# Patient Record
Sex: FEMALE | Race: OTHER | NOT HISPANIC OR LATINO
[De-identification: names, ages, dates, MRNs, and addresses within clinical notes are randomized per-mention and may not be internally consistent; named-entity substitution may affect disease eponyms.]

---

## 2018-08-31 ENCOUNTER — APPOINTMENT (OUTPATIENT)
Dept: OBGYN | Facility: CLINIC | Age: 38
End: 2018-08-31

## 2018-11-27 ENCOUNTER — APPOINTMENT (OUTPATIENT)
Dept: OBGYN | Facility: CLINIC | Age: 38
End: 2018-11-27
Payer: COMMERCIAL

## 2018-11-27 VITALS
SYSTOLIC BLOOD PRESSURE: 120 MMHG | HEIGHT: 59 IN | DIASTOLIC BLOOD PRESSURE: 75 MMHG | BODY MASS INDEX: 23.07 KG/M2 | WEIGHT: 114.44 LBS | HEART RATE: 63 BPM

## 2018-11-27 DIAGNOSIS — R10.32 LEFT LOWER QUADRANT PAIN: ICD-10-CM

## 2018-11-27 PROCEDURE — 99395 PREV VISIT EST AGE 18-39: CPT

## 2018-11-27 RX ORDER — CHROMIUM 200 MCG
TABLET ORAL
Refills: 0 | Status: ACTIVE | COMMUNITY

## 2019-01-07 ENCOUNTER — APPOINTMENT (OUTPATIENT)
Dept: ULTRASOUND IMAGING | Facility: CLINIC | Age: 39
End: 2019-01-07
Payer: COMMERCIAL

## 2019-01-07 ENCOUNTER — APPOINTMENT (OUTPATIENT)
Dept: MAMMOGRAPHY | Facility: CLINIC | Age: 39
End: 2019-01-07
Payer: COMMERCIAL

## 2019-01-07 ENCOUNTER — OUTPATIENT (OUTPATIENT)
Dept: OUTPATIENT SERVICES | Facility: HOSPITAL | Age: 39
LOS: 1 days | End: 2019-01-07
Payer: COMMERCIAL

## 2019-01-07 DIAGNOSIS — Z15.01 GENETIC SUSCEPTIBILITY TO MALIGNANT NEOPLASM OF BREAST: ICD-10-CM

## 2019-01-07 DIAGNOSIS — Z00.8 ENCOUNTER FOR OTHER GENERAL EXAMINATION: ICD-10-CM

## 2019-01-07 PROCEDURE — 76856 US EXAM PELVIC COMPLETE: CPT | Mod: 26

## 2019-01-07 PROCEDURE — 77063 BREAST TOMOSYNTHESIS BI: CPT | Mod: 26

## 2019-01-07 PROCEDURE — 77067 SCR MAMMO BI INCL CAD: CPT | Mod: 26

## 2019-01-07 PROCEDURE — 76641 ULTRASOUND BREAST COMPLETE: CPT | Mod: 26,50

## 2019-01-07 PROCEDURE — 77063 BREAST TOMOSYNTHESIS BI: CPT

## 2019-01-07 PROCEDURE — 76856 US EXAM PELVIC COMPLETE: CPT

## 2019-01-07 PROCEDURE — 76641 ULTRASOUND BREAST COMPLETE: CPT

## 2019-01-07 PROCEDURE — 77067 SCR MAMMO BI INCL CAD: CPT

## 2019-12-03 ENCOUNTER — APPOINTMENT (OUTPATIENT)
Dept: OBGYN | Facility: CLINIC | Age: 39
End: 2019-12-03

## 2020-03-09 ENCOUNTER — APPOINTMENT (OUTPATIENT)
Dept: OBGYN | Facility: CLINIC | Age: 40
End: 2020-03-09

## 2020-04-20 ENCOUNTER — APPOINTMENT (OUTPATIENT)
Dept: OBGYN | Facility: CLINIC | Age: 40
End: 2020-04-20

## 2020-07-21 ENCOUNTER — APPOINTMENT (OUTPATIENT)
Dept: OBGYN | Facility: CLINIC | Age: 40
End: 2020-07-21
Payer: COMMERCIAL

## 2020-07-21 VITALS
DIASTOLIC BLOOD PRESSURE: 72 MMHG | SYSTOLIC BLOOD PRESSURE: 104 MMHG | HEIGHT: 59 IN | BODY MASS INDEX: 24.39 KG/M2 | WEIGHT: 121 LBS | TEMPERATURE: 97.6 F | HEART RATE: 76 BPM

## 2020-07-21 PROCEDURE — 99395 PREV VISIT EST AGE 18-39: CPT

## 2020-07-21 RX ORDER — LEVONORGESTREL/ETHINYL ESTRADIOL AND ETHINYL ESTRADIOL 100-20(84)
0.1-0.02 & 0.01 KIT ORAL DAILY
Qty: 90 | Refills: 3 | Status: DISCONTINUED | COMMUNITY
Start: 2018-11-27 | End: 2020-07-21

## 2020-07-21 NOTE — CHIEF COMPLAINT
[Annual Visit] : annual visit [FreeTextEntry1] : 39 y.o. P 3033  LmP 6/26/20 for annual exam. pt feels well. pt has first degree relative with breast ca , pt is s/p BTL \par PCP is Dr. Wallace. mother has breast ca, and mat. aunt has ovarian ca pt c/o pressure with urination. as well as frequency.  pt had results from previous testing with PCP that showed an increased risk for hereditary cancer

## 2020-07-24 LAB
BACTERIA UR CULT: ABNORMAL
HPV HIGH+LOW RISK DNA PNL CVX: NOT DETECTED

## 2020-07-27 LAB — CYTOLOGY CVX/VAG DOC THIN PREP: NORMAL

## 2020-08-08 ENCOUNTER — APPOINTMENT (OUTPATIENT)
Dept: MAMMOGRAPHY | Facility: CLINIC | Age: 40
End: 2020-08-08
Payer: COMMERCIAL

## 2020-08-08 ENCOUNTER — RESULT REVIEW (OUTPATIENT)
Age: 40
End: 2020-08-08

## 2020-08-08 ENCOUNTER — APPOINTMENT (OUTPATIENT)
Dept: ULTRASOUND IMAGING | Facility: CLINIC | Age: 40
End: 2020-08-08
Payer: COMMERCIAL

## 2020-08-08 ENCOUNTER — OUTPATIENT (OUTPATIENT)
Dept: OUTPATIENT SERVICES | Facility: HOSPITAL | Age: 40
LOS: 1 days | End: 2020-08-08
Payer: COMMERCIAL

## 2020-08-08 DIAGNOSIS — Z00.8 ENCOUNTER FOR OTHER GENERAL EXAMINATION: ICD-10-CM

## 2020-08-08 PROCEDURE — 77067 SCR MAMMO BI INCL CAD: CPT | Mod: 26

## 2020-08-08 PROCEDURE — 76641 ULTRASOUND BREAST COMPLETE: CPT | Mod: 26,RT

## 2020-08-08 PROCEDURE — 76641 ULTRASOUND BREAST COMPLETE: CPT

## 2020-08-08 PROCEDURE — 77063 BREAST TOMOSYNTHESIS BI: CPT

## 2020-08-08 PROCEDURE — 76641 ULTRASOUND BREAST COMPLETE: CPT | Mod: 26,LT

## 2020-08-08 PROCEDURE — 77063 BREAST TOMOSYNTHESIS BI: CPT | Mod: 26

## 2020-08-08 PROCEDURE — 77067 SCR MAMMO BI INCL CAD: CPT

## 2021-07-28 ENCOUNTER — APPOINTMENT (OUTPATIENT)
Dept: OBGYN | Facility: CLINIC | Age: 41
End: 2021-07-28
Payer: COMMERCIAL

## 2021-07-28 VITALS
HEIGHT: 59 IN | DIASTOLIC BLOOD PRESSURE: 75 MMHG | TEMPERATURE: 97.4 F | BODY MASS INDEX: 22.78 KG/M2 | HEART RATE: 76 BPM | WEIGHT: 113 LBS | SYSTOLIC BLOOD PRESSURE: 112 MMHG

## 2021-07-28 PROCEDURE — 99396 PREV VISIT EST AGE 40-64: CPT

## 2021-07-28 PROCEDURE — 99072 ADDL SUPL MATRL&STAF TM PHE: CPT

## 2021-07-28 RX ORDER — CIPROFLOXACIN HYDROCHLORIDE 500 MG/1
500 TABLET, FILM COATED ORAL
Qty: 6 | Refills: 0 | Status: DISCONTINUED | COMMUNITY
Start: 2020-07-24 | End: 2021-07-28

## 2021-07-28 NOTE — DISCUSSION/SUMMARY
[FreeTextEntry1] : A - WWV\par       BRCA positve\par     family hx of breast and ovarian ca\par     s/p BTL\par \par P- f/u 1 year\par     GYN  [facility] referral for counselling\par      pap next due in 2023\par      exercise encouraged\par      nutritional counseling provided\par     mammo, pelvic sono and breast sono referrals given\par

## 2021-07-28 NOTE — HISTORY OF PRESENT ILLNESS
[FreeTextEntry1] : 40 y.o. P 3033  LN  7/5/21, for annual exam. pt reports q 25 day cycles , heavy x 9 days. . pt has strong family hx of breast ( mom) and ovarian ca ( mom's sister) and found to have abnormal COLOR testing , for BRCA-1. \par pt is s/p BTL

## 2022-03-14 ENCOUNTER — APPOINTMENT (OUTPATIENT)
Dept: ULTRASOUND IMAGING | Facility: CLINIC | Age: 42
End: 2022-03-14
Payer: COMMERCIAL

## 2022-03-14 ENCOUNTER — RESULT REVIEW (OUTPATIENT)
Age: 42
End: 2022-03-14

## 2022-03-14 ENCOUNTER — APPOINTMENT (OUTPATIENT)
Dept: MAMMOGRAPHY | Facility: CLINIC | Age: 42
End: 2022-03-14
Payer: COMMERCIAL

## 2022-03-14 ENCOUNTER — OUTPATIENT (OUTPATIENT)
Dept: OUTPATIENT SERVICES | Facility: HOSPITAL | Age: 42
LOS: 1 days | End: 2022-03-14
Payer: COMMERCIAL

## 2022-03-14 DIAGNOSIS — Z15.01 GENETIC SUSCEPTIBILITY TO MALIGNANT NEOPLASM OF BREAST: ICD-10-CM

## 2022-03-14 DIAGNOSIS — Z00.8 ENCOUNTER FOR OTHER GENERAL EXAMINATION: ICD-10-CM

## 2022-03-14 PROCEDURE — 76830 TRANSVAGINAL US NON-OB: CPT | Mod: 26

## 2022-03-14 PROCEDURE — 76641 ULTRASOUND BREAST COMPLETE: CPT

## 2022-03-14 PROCEDURE — 76641 ULTRASOUND BREAST COMPLETE: CPT | Mod: 26,26,RT

## 2022-03-14 PROCEDURE — 76641 ULTRASOUND BREAST COMPLETE: CPT | Mod: 26,LT

## 2022-03-14 PROCEDURE — 77067 SCR MAMMO BI INCL CAD: CPT

## 2022-03-14 PROCEDURE — 77067 SCR MAMMO BI INCL CAD: CPT | Mod: 26

## 2022-03-14 PROCEDURE — 77063 BREAST TOMOSYNTHESIS BI: CPT

## 2022-03-14 PROCEDURE — 76830 TRANSVAGINAL US NON-OB: CPT

## 2022-03-14 PROCEDURE — 77063 BREAST TOMOSYNTHESIS BI: CPT | Mod: 26

## 2022-03-17 DIAGNOSIS — R93.89 ABNORMAL FINDINGS ON DIAGNOSTIC IMAGING OF OTHER SPECIFIED BODY STRUCTURES: ICD-10-CM

## 2022-03-25 ENCOUNTER — APPOINTMENT (OUTPATIENT)
Dept: MAMMOGRAPHY | Facility: CLINIC | Age: 42
End: 2022-03-25
Payer: COMMERCIAL

## 2022-03-25 ENCOUNTER — RESULT REVIEW (OUTPATIENT)
Age: 42
End: 2022-03-25

## 2022-03-25 ENCOUNTER — OUTPATIENT (OUTPATIENT)
Dept: OUTPATIENT SERVICES | Facility: HOSPITAL | Age: 42
LOS: 1 days | End: 2022-03-25
Payer: COMMERCIAL

## 2022-03-25 ENCOUNTER — APPOINTMENT (OUTPATIENT)
Dept: ULTRASOUND IMAGING | Facility: CLINIC | Age: 42
End: 2022-03-25
Payer: COMMERCIAL

## 2022-03-25 DIAGNOSIS — Z00.8 ENCOUNTER FOR OTHER GENERAL EXAMINATION: ICD-10-CM

## 2022-03-25 PROCEDURE — 77065 DX MAMMO INCL CAD UNI: CPT

## 2022-03-25 PROCEDURE — G0279: CPT

## 2022-03-25 PROCEDURE — 76642 ULTRASOUND BREAST LIMITED: CPT | Mod: 26,RT

## 2022-03-25 PROCEDURE — 77065 DX MAMMO INCL CAD UNI: CPT | Mod: 26,RT

## 2022-03-25 PROCEDURE — G0279: CPT | Mod: 26

## 2022-03-25 PROCEDURE — 76642 ULTRASOUND BREAST LIMITED: CPT

## 2022-08-03 ENCOUNTER — APPOINTMENT (OUTPATIENT)
Dept: OBGYN | Facility: CLINIC | Age: 42
End: 2022-08-03

## 2023-01-31 ENCOUNTER — RESULT REVIEW (OUTPATIENT)
Age: 43
End: 2023-01-31

## 2023-01-31 ENCOUNTER — APPOINTMENT (OUTPATIENT)
Dept: OBGYN | Facility: CLINIC | Age: 43
End: 2023-01-31
Payer: COMMERCIAL

## 2023-01-31 VITALS
SYSTOLIC BLOOD PRESSURE: 110 MMHG | DIASTOLIC BLOOD PRESSURE: 73 MMHG | BODY MASS INDEX: 21.57 KG/M2 | HEART RATE: 71 BPM | HEIGHT: 59 IN | WEIGHT: 107 LBS

## 2023-01-31 DIAGNOSIS — E78.00 PURE HYPERCHOLESTEROLEMIA, UNSPECIFIED: ICD-10-CM

## 2023-01-31 PROCEDURE — 99396 PREV VISIT EST AGE 40-64: CPT

## 2023-01-31 NOTE — DISCUSSION/SUMMARY
[FreeTextEntry1] : A - WWV\par       BRCA-1 positive\par      hypercholesterolemia. \par \par P  will refer to [facility] for counseling for surgical options given genetic testing results and family history\par      pap done\par      pt is exercising, \par     advised diet modification to bring down Cholesterol and LDL and TG's, \par      referrals for mammo, breast sono and pelvic sono given , to evaluate ovaries for BRCA-1 positivity,

## 2023-01-31 NOTE — HISTORY OF PRESENT ILLNESS
[FreeTextEntry1] : 42 y.o. P 3033  LN 1/18/23 for annual exam. pt feels well, , , pt has first degree relative with breast  ca, and second degree relative with ovarian ca. , pt is s/p BTL. pt is BRCA -1 positive, pt had genetic counseling with Genetic counselor An Crawford on 3/3/21. ( see report in chart). \par pt  takes baby ASA recommended by PCP, pt also takes Vit. D and Biotin. \par pt brings in labwork today, that reveals hypercholesterolemia, with elevated non-HDL, and LDL, values.

## 2023-02-01 LAB — HPV HIGH+LOW RISK DNA PNL CVX: NOT DETECTED

## 2023-02-06 LAB — CYTOLOGY CVX/VAG DOC THIN PREP: NORMAL

## 2023-03-30 ENCOUNTER — OUTPATIENT (OUTPATIENT)
Dept: OUTPATIENT SERVICES | Facility: HOSPITAL | Age: 43
LOS: 1 days | End: 2023-03-30
Payer: COMMERCIAL

## 2023-03-30 ENCOUNTER — RESULT REVIEW (OUTPATIENT)
Age: 43
End: 2023-03-30

## 2023-03-30 ENCOUNTER — APPOINTMENT (OUTPATIENT)
Dept: MAMMOGRAPHY | Facility: CLINIC | Age: 43
End: 2023-03-30
Payer: COMMERCIAL

## 2023-03-30 ENCOUNTER — OUTPATIENT (OUTPATIENT)
Dept: OUTPATIENT SERVICES | Facility: HOSPITAL | Age: 43
LOS: 1 days | End: 2023-03-30

## 2023-03-30 ENCOUNTER — APPOINTMENT (OUTPATIENT)
Dept: ULTRASOUND IMAGING | Facility: CLINIC | Age: 43
End: 2023-03-30
Payer: COMMERCIAL

## 2023-03-30 DIAGNOSIS — Z00.8 ENCOUNTER FOR OTHER GENERAL EXAMINATION: ICD-10-CM

## 2023-03-30 DIAGNOSIS — Z15.01 GENETIC SUSCEPTIBILITY TO MALIGNANT NEOPLASM OF BREAST: ICD-10-CM

## 2023-03-30 PROCEDURE — 76830 TRANSVAGINAL US NON-OB: CPT

## 2023-03-30 PROCEDURE — 76830 TRANSVAGINAL US NON-OB: CPT | Mod: 26

## 2023-03-30 PROCEDURE — 76641 ULTRASOUND BREAST COMPLETE: CPT | Mod: 26,RT

## 2023-03-30 PROCEDURE — G0279: CPT | Mod: 26

## 2023-03-30 PROCEDURE — 77066 DX MAMMO INCL CAD BI: CPT | Mod: 26

## 2023-03-30 PROCEDURE — 76641 ULTRASOUND BREAST COMPLETE: CPT | Mod: 26,LT

## 2023-03-30 PROCEDURE — 77066 DX MAMMO INCL CAD BI: CPT

## 2023-03-30 PROCEDURE — G0279: CPT

## 2023-03-30 PROCEDURE — 76641 ULTRASOUND BREAST COMPLETE: CPT

## 2024-02-06 ENCOUNTER — TRANSCRIPTION ENCOUNTER (OUTPATIENT)
Age: 44
End: 2024-02-06

## 2024-02-06 ENCOUNTER — APPOINTMENT (OUTPATIENT)
Dept: OBGYN | Facility: CLINIC | Age: 44
End: 2024-02-06
Payer: COMMERCIAL

## 2024-02-06 VITALS
WEIGHT: 111 LBS | HEART RATE: 83 BPM | HEIGHT: 59 IN | DIASTOLIC BLOOD PRESSURE: 71 MMHG | SYSTOLIC BLOOD PRESSURE: 105 MMHG | BODY MASS INDEX: 22.38 KG/M2

## 2024-02-06 DIAGNOSIS — Z01.419 ENCOUNTER FOR GYNECOLOGICAL EXAMINATION (GENERAL) (ROUTINE) W/OUT ABNORMAL FINDINGS: ICD-10-CM

## 2024-02-06 DIAGNOSIS — Z15.01 GENETIC SUSCEPTIBILITY TO MALIGNANT NEOPLASM OF BREAST: ICD-10-CM

## 2024-02-06 DIAGNOSIS — Z15.09 GENETIC SUSCEPTIBILITY TO MALIGNANT NEOPLASM OF BREAST: ICD-10-CM

## 2024-02-06 DIAGNOSIS — Z80.9 FAMILY HISTORY OF MALIGNANT NEOPLASM, UNSPECIFIED: ICD-10-CM

## 2024-02-06 DIAGNOSIS — Z91.89 OTHER SPECIFIED PERSONAL RISK FACTORS, NOT ELSEWHERE CLASSIFIED: ICD-10-CM

## 2024-02-06 PROCEDURE — 99396 PREV VISIT EST AGE 40-64: CPT

## 2024-02-06 NOTE — HISTORY OF PRESENT ILLNESS
[FreeTextEntry1] : 43 y.o. P 3033  LNMP 1/22/24, presents for annual exam., , pt feels well, ,  PMH BRCA-1 positive,,  Hypercholesterolemia.  pt is going for f/u 2/19/24. , PSHx - negative, FH- Breast ca - mother,  Ovarian ca - mat. aunt. ,  SH- negative, GYN hx - ETOP x 3, BTL, ,  OB hx - C/S x 3, ,  ROS pt is taking  Vit. D and ASA 81mg,  mammo/sono March 2023 Birads-2 pelvic sono March 2023 - WnL pap  Jan. 2023 - WNL.  pt works  with plastic surgeon in [place]

## 2024-02-06 NOTE — DISCUSSION/SUMMARY
[FreeTextEntry1] : A - WWV       BRCA positive     at risk for UTI      P- will refer to [facility] for consultation re: adnexectomy for family hx of breast and ovarian ca and BRCA positive,       Urine C&S done        pap next due in 2026     exercise  encouraged     nutritional counseling provided       referral for mammo and breast sono given

## 2024-02-09 ENCOUNTER — NON-APPOINTMENT (OUTPATIENT)
Age: 44
End: 2024-02-09

## 2024-02-09 LAB — BACTERIA UR CULT: ABNORMAL

## 2024-04-03 ENCOUNTER — OUTPATIENT (OUTPATIENT)
Dept: OUTPATIENT SERVICES | Facility: HOSPITAL | Age: 44
LOS: 1 days | End: 2024-04-03
Payer: COMMERCIAL

## 2024-04-03 ENCOUNTER — APPOINTMENT (OUTPATIENT)
Dept: ULTRASOUND IMAGING | Facility: CLINIC | Age: 44
End: 2024-04-03
Payer: COMMERCIAL

## 2024-04-03 ENCOUNTER — APPOINTMENT (OUTPATIENT)
Dept: MAMMOGRAPHY | Facility: CLINIC | Age: 44
End: 2024-04-03
Payer: COMMERCIAL

## 2024-04-03 ENCOUNTER — TRANSCRIPTION ENCOUNTER (OUTPATIENT)
Age: 44
End: 2024-04-03

## 2024-04-03 ENCOUNTER — RESULT REVIEW (OUTPATIENT)
Age: 44
End: 2024-04-03

## 2024-04-03 ENCOUNTER — APPOINTMENT (OUTPATIENT)
Dept: ULTRASOUND IMAGING | Facility: CLINIC | Age: 44
End: 2024-04-03

## 2024-04-03 DIAGNOSIS — R92.1 MAMMOGRAPHIC CALCIFICATION FOUND ON DIAGNOSTIC IMAGING OF BREAST: ICD-10-CM

## 2024-04-03 DIAGNOSIS — Z00.8 ENCOUNTER FOR OTHER GENERAL EXAMINATION: ICD-10-CM

## 2024-04-03 PROCEDURE — 77067 SCR MAMMO BI INCL CAD: CPT | Mod: 26

## 2024-04-03 PROCEDURE — 76641 ULTRASOUND BREAST COMPLETE: CPT

## 2024-04-03 PROCEDURE — 77063 BREAST TOMOSYNTHESIS BI: CPT | Mod: 26

## 2024-04-03 PROCEDURE — 77063 BREAST TOMOSYNTHESIS BI: CPT

## 2024-04-03 PROCEDURE — 76830 TRANSVAGINAL US NON-OB: CPT | Mod: 26

## 2024-04-03 PROCEDURE — 76830 TRANSVAGINAL US NON-OB: CPT

## 2024-04-03 PROCEDURE — 76641 ULTRASOUND BREAST COMPLETE: CPT | Mod: 26,RT

## 2024-04-03 PROCEDURE — 77067 SCR MAMMO BI INCL CAD: CPT

## 2024-04-04 ENCOUNTER — TRANSCRIPTION ENCOUNTER (OUTPATIENT)
Age: 44
End: 2024-04-04

## 2024-04-05 ENCOUNTER — TRANSCRIPTION ENCOUNTER (OUTPATIENT)
Age: 44
End: 2024-04-05

## 2024-04-05 ENCOUNTER — RESULT REVIEW (OUTPATIENT)
Age: 44
End: 2024-04-05

## 2024-04-08 ENCOUNTER — APPOINTMENT (OUTPATIENT)
Dept: MAMMOGRAPHY | Facility: CLINIC | Age: 44
End: 2024-04-08

## 2024-04-08 ENCOUNTER — TRANSCRIPTION ENCOUNTER (OUTPATIENT)
Age: 44
End: 2024-04-08

## 2024-04-08 ENCOUNTER — RESULT REVIEW (OUTPATIENT)
Age: 44
End: 2024-04-08

## 2024-04-08 ENCOUNTER — APPOINTMENT (OUTPATIENT)
Dept: MAMMOGRAPHY | Facility: CLINIC | Age: 44
End: 2024-04-08
Payer: COMMERCIAL

## 2024-04-08 ENCOUNTER — OUTPATIENT (OUTPATIENT)
Dept: OUTPATIENT SERVICES | Facility: HOSPITAL | Age: 44
LOS: 1 days | End: 2024-04-08
Payer: COMMERCIAL

## 2024-04-08 DIAGNOSIS — Z00.8 ENCOUNTER FOR OTHER GENERAL EXAMINATION: ICD-10-CM

## 2024-04-08 PROCEDURE — 77065 DX MAMMO INCL CAD UNI: CPT | Mod: 26,RT

## 2024-04-08 PROCEDURE — 77065 DX MAMMO INCL CAD UNI: CPT

## 2024-04-15 ENCOUNTER — APPOINTMENT (OUTPATIENT)
Dept: MAMMOGRAPHY | Facility: IMAGING CENTER | Age: 44
End: 2024-04-15
Payer: COMMERCIAL

## 2024-04-15 ENCOUNTER — RESULT REVIEW (OUTPATIENT)
Age: 44
End: 2024-04-15

## 2024-04-15 ENCOUNTER — OUTPATIENT (OUTPATIENT)
Dept: OUTPATIENT SERVICES | Facility: HOSPITAL | Age: 44
LOS: 1 days | End: 2024-04-15
Payer: COMMERCIAL

## 2024-04-15 DIAGNOSIS — Z00.8 ENCOUNTER FOR OTHER GENERAL EXAMINATION: ICD-10-CM

## 2024-04-15 PROCEDURE — 76098 X-RAY EXAM SURGICAL SPECIMEN: CPT | Mod: 26

## 2024-04-15 PROCEDURE — 88305 TISSUE EXAM BY PATHOLOGIST: CPT | Mod: 26

## 2024-04-15 PROCEDURE — 88305 TISSUE EXAM BY PATHOLOGIST: CPT

## 2024-04-15 PROCEDURE — 77065 DX MAMMO INCL CAD UNI: CPT

## 2024-04-15 PROCEDURE — A4648: CPT

## 2024-04-15 PROCEDURE — 77065 DX MAMMO INCL CAD UNI: CPT | Mod: 26,RT

## 2024-04-15 PROCEDURE — 19081 BX BREAST 1ST LESION STRTCTC: CPT

## 2024-04-15 PROCEDURE — 19081 BX BREAST 1ST LESION STRTCTC: CPT | Mod: RT

## 2024-04-19 ENCOUNTER — APPOINTMENT (OUTPATIENT)
Dept: MRI IMAGING | Facility: CLINIC | Age: 44
End: 2024-04-19

## 2024-06-13 ENCOUNTER — APPOINTMENT (OUTPATIENT)
Dept: SURGICAL ONCOLOGY | Facility: CLINIC | Age: 44
End: 2024-06-13

## 2024-11-04 ENCOUNTER — EMERGENCY (EMERGENCY)
Facility: HOSPITAL | Age: 44
LOS: 1 days | Discharge: HOME | End: 2024-11-04
Attending: EMERGENCY MEDICINE
Payer: COMMERCIAL

## 2024-11-04 VITALS
TEMPERATURE: 98 F | SYSTOLIC BLOOD PRESSURE: 124 MMHG | HEART RATE: 68 BPM | OXYGEN SATURATION: 100 % | RESPIRATION RATE: 18 BRPM | DIASTOLIC BLOOD PRESSURE: 84 MMHG

## 2024-11-04 VITALS
OXYGEN SATURATION: 100 % | HEIGHT: 64 IN | HEART RATE: 85 BPM | WEIGHT: 149.91 LBS | SYSTOLIC BLOOD PRESSURE: 124 MMHG | TEMPERATURE: 98 F | DIASTOLIC BLOOD PRESSURE: 85 MMHG | RESPIRATION RATE: 20 BRPM

## 2024-11-04 PROCEDURE — 99284 EMERGENCY DEPT VISIT MOD MDM: CPT

## 2024-11-04 PROCEDURE — 99283 EMERGENCY DEPT VISIT LOW MDM: CPT

## 2024-11-04 RX ORDER — DIPHENHYDRAMINE HCL 12.5MG/5ML
50 ELIXIR ORAL ONCE
Refills: 0 | Status: COMPLETED | OUTPATIENT
Start: 2024-11-04 | End: 2024-11-04

## 2024-11-04 RX ORDER — ACETAMINOPHEN 500 MG
975 TABLET ORAL ONCE
Refills: 0 | Status: COMPLETED | OUTPATIENT
Start: 2024-11-04 | End: 2024-11-04

## 2024-11-04 RX ORDER — METHOCARBAMOL 500 MG/1
1500 TABLET ORAL ONCE
Refills: 0 | Status: COMPLETED | OUTPATIENT
Start: 2024-11-04 | End: 2024-11-04

## 2024-11-04 RX ORDER — LIDOCAINE HYDROCHLORIDE 40 MG/ML
1 SOLUTION TOPICAL ONCE
Refills: 0 | Status: COMPLETED | OUTPATIENT
Start: 2024-11-04 | End: 2024-11-04

## 2024-11-04 RX ORDER — METHOCARBAMOL 500 MG/1
2 TABLET ORAL
Qty: 18 | Refills: 0
Start: 2024-11-04 | End: 2024-11-06

## 2024-11-04 RX ADMIN — Medication 50 MILLIGRAM(S): at 18:51

## 2024-11-04 RX ADMIN — Medication 975 MILLIGRAM(S): at 18:25

## 2024-11-04 RX ADMIN — METHOCARBAMOL 1500 MILLIGRAM(S): 500 TABLET ORAL at 18:27

## 2024-11-04 RX ADMIN — LIDOCAINE HYDROCHLORIDE 1 PATCH: 40 SOLUTION TOPICAL at 18:27

## 2024-11-04 NOTE — ED ADULT NURSE NOTE - OBJECTIVE STATEMENT
44 year old female brought into ED complaining of right sided neck pain that radiates down the arm. A&Ox4. No significant PMH. Patient states she has had this pain from October 28th. Patient states that the pain came on suddenly without any trauma to it. Patient states she has taken celebrex and tramadol with minimal relief. Patient states she has numbness and tingling down the right arm. Patient ambulatory. Patient breathing spontaneously and unlabored.

## 2024-11-04 NOTE — ED PROVIDER NOTE - NSFOLLOWUPINSTRUCTIONS_ED_ALL_ED_FT
1. Please follow up with your Primary Care Doctor after discharge, bring a copy of your results to follow up appointment for review     2. Please rest, stay hydrated and continue all at home medications as previously prescribed    3. For continued or recurrent pain recommend taking over the counter Tylenol (acetaminophen) 1000 mg every 6 hours as needed and/or over the counter Motrin (Ibuprofen/Advil) 600mg every 6 hours as needed    4. For muscle spasms you may take take Robaxin 1500mg every 8 hours as needed     5. Follow up with __ after discharge for reevaluation and continued management. Please see office information below to call and schedule appointment:     6. Return to ED for any new or worsened symptoms of concern 1. Please follow up with your Primary Care Doctor after discharge, bring a copy of your results to follow up appointment for review     2. Please rest, stay hydrated and continue all at home medications as previously prescribed    3. For continued or recurrent pain recommend taking over the counter Tylenol (acetaminophen) 1000 mg every 6 hours as needed and/or over the counter Motrin (Ibuprofen/Advil) 600mg every 6 hours as needed. For additional relief of your pain you may use over the counter Lidocaine patches such as Salonpas     4. For muscle spasms you may take take Robaxin 1500mg every 8 hours as needed     5. Follow up with Spine Specialist after discharge for reevaluation and continued management. Call [facility] - [phone] to schedule an appointment     6. Return to ED for any new or worsened symptoms of concern

## 2024-11-04 NOTE — ED PROVIDER NOTE - OBJECTIVE STATEMENT
44-year-old female with history of hyperlipidemia presents emergency department complaining of right-sided neck pain which initially started approximately 1 week ago.  Patient reports that she woke up with the pain.  Tried Motrin and Tylenol without significant relief.  She reports that a few days later also noticed that the pain started to radiate down her arm associated with a tingling sensation and heaviness.  She was seen at her job by one of the providers and was prescribed Celebrex and Flexeril which she has been taking with some relief.  She reports that she spoke to a provider in regards to her persistent pain and was referred to the ED.  Patient has never had similar neck pain before.  She denies falls, trauma, change in school activity, headache, dizziness, numbness, chest pain, shortness of breath, abdominal pain nausea or vomiting

## 2024-11-04 NOTE — ED PROVIDER NOTE - PATIENT PORTAL LINK FT
You can access the FollowMyHealth Patient Portal offered by [facility] by registering at the following website: [email or web address]. By joining [employer]’s FollowMyHealth portal, you will also be able to view your health information using other applications (apps) compatible with our system.

## 2024-11-04 NOTE — ED PROVIDER NOTE - PHYSICAL EXAMINATION
CONSTITUTIONAL: Patient is awake, alert and oriented x 3. Patient is well appearing and in no acute distress  HEAD: NCAT  EYES: PERRL b/l, EOMI  ENT: Airway patent, Nasal mucosa clear. Mouth with normal mucosa. Throat has no vesicles, no oropharyngeal exudates and uvula is midline  NECK: supple, FROM  LUNGS: CTA b/l, no wheezing or rales   HEART: RRR.+S1S2 no murmurs  ABDOMEN: Soft, non-distended, nttp,  no rebound or guarding  EXTREMITY: No edema or calf tenderness b/l, FROM upper and lower ext b/l. No midline cervical, thoracic or lumbar ttp. There is right sided trapezius ttp   SKIN: No rash or lesions  NEURO: Cn3-12 grossly intact. Strength 5/5 UE/LE b/l. Normal gross sensation UE/LE b/l. Gait normal

## 2024-11-04 NOTE — ED PROVIDER NOTE - CLINICAL SUMMARY MEDICAL DECISION MAKING FREE TEXT BOX
Subjective:  - Chief Complaint (CC) : The patient is a 44-year-old female with a history of hyperlipidemia, presenting with pain and numbness on her right side, particularly in the trapezius area, which began about a week ago. The pain includes tingling that radiates down her arm and has been intermittent.  - History of Present Illness : The patient states the discomfort began upon waking up one week ago, and attempted self-treatment with Motrin and Tylenol but found no relief. Three days following the onset, she also experienced shooting pain down her arm, described as a tingling and numbness that has been intermittent. 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 These medications give her temporary relief, but the discomfort returns once the drugs wear off. After discussing her symptoms again with her colleagues, it was suggested to her that she should go to an emergency department for further evaluation. She states she has no history of prior neck pain or injuries, and denies any recent physical trauma or heavy lifting. She states this is not affecting her hand and does not feel weak, rather, a tingling sensation up to her wrist. The patient denies experiencing any lightheadedness, dizziness, chest pain, or shortness of breath.  - History : The patient is a 44-year-old female with a known history of hyperlipidemia. The patient states she has no prior history of neck pain or traumatic neck injuries. 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 She does not report any family history of similar symptoms.  Review of Systems:  - The patient reports intermittent tingling and numbness in her right arm and trapezius area, but denies any other significant symptoms.    Objective:  - Vital Signs :  - Vital signs were stable and within normal limits during the visit.    - Physical Examination (PE) :  - During physical examination, the patient looked well. There was reproducible pain in the right trapezius area. The patient was able to move the right upper extremity without any limitations and demonstrated 5 out of 5 strength throughout, with equal sensation in the right upper extremity compared to the left.    Differential Diagnosis:  - Muscle spasm, nerve impingement, radiculopathy    Assessment and Plan:  - Problem #1 : Right side and arm pain and numbness  - Assessment/Plan : Based on the patient's presentation and physical examination, this is likely secondary to muscle spasm. Even though the patient was on Flexeril and Celebrex, the condition did not improve significantly indicating that a change in treatment may be required.  - A decision was made to offer a lidocaine patch and consider switching her to a different muscle relaxant to provide relief.    - Consider Physiotherapy (PT) for symptom relief.    - As a next step, if symptoms persist, refer the patient for a spine evaluation.    - Assure the patient that her strength is good, alleviating some concerns about spinal cord involvement.    - Encourage the patient to return for re-evaluation if the symptoms persist or worsen.

## 2025-02-10 PROBLEM — E78.5 HYPERLIPIDEMIA, UNSPECIFIED: Chronic | Status: ACTIVE | Noted: 2024-11-04

## 2025-02-11 ENCOUNTER — APPOINTMENT (OUTPATIENT)
Dept: OBGYN | Facility: CLINIC | Age: 45
End: 2025-02-11

## 2025-03-18 ENCOUNTER — APPOINTMENT (OUTPATIENT)
Dept: OBGYN | Facility: CLINIC | Age: 45
End: 2025-03-18
Payer: COMMERCIAL

## 2025-03-18 VITALS
SYSTOLIC BLOOD PRESSURE: 103 MMHG | BODY MASS INDEX: 22.18 KG/M2 | DIASTOLIC BLOOD PRESSURE: 69 MMHG | HEART RATE: 76 BPM | HEIGHT: 59 IN | WEIGHT: 110 LBS

## 2025-03-18 DIAGNOSIS — Z91.89 OTHER SPECIFIED PERSONAL RISK FACTORS, NOT ELSEWHERE CLASSIFIED: ICD-10-CM

## 2025-03-18 DIAGNOSIS — Z15.09 GENETIC SUSCEPTIBILITY TO MALIGNANT NEOPLASM OF BREAST: ICD-10-CM

## 2025-03-18 DIAGNOSIS — Z80.9 FAMILY HISTORY OF MALIGNANT NEOPLASM, UNSPECIFIED: ICD-10-CM

## 2025-03-18 DIAGNOSIS — Z01.419 ENCOUNTER FOR GYNECOLOGICAL EXAMINATION (GENERAL) (ROUTINE) W/OUT ABNORMAL FINDINGS: ICD-10-CM

## 2025-03-18 DIAGNOSIS — Z15.01 GENETIC SUSCEPTIBILITY TO MALIGNANT NEOPLASM OF BREAST: ICD-10-CM

## 2025-03-18 PROCEDURE — 99459 PELVIC EXAMINATION: CPT

## 2025-03-18 PROCEDURE — 99214 OFFICE O/P EST MOD 30 MIN: CPT | Mod: 25

## 2025-03-18 PROCEDURE — 99396 PREV VISIT EST AGE 40-64: CPT

## 2025-03-19 LAB
ALBUMIN SERPL ELPH-MCNC: 4.9 G/DL
ALP BLD-CCNC: 139 U/L
ALT SERPL-CCNC: 23 U/L
ANION GAP SERPL CALC-SCNC: 16 MMOL/L
AST SERPL-CCNC: 22 U/L
BILIRUB SERPL-MCNC: 0.2 MG/DL
BUN SERPL-MCNC: 13 MG/DL
C TRACH RRNA SPEC QL NAA+PROBE: NOT DETECTED
CALCIUM SERPL-MCNC: 10 MG/DL
CHLORIDE SERPL-SCNC: 100 MMOL/L
CHOLEST SERPL-MCNC: 262 MG/DL
CO2 SERPL-SCNC: 24 MMOL/L
CREAT SERPL-MCNC: 0.81 MG/DL
EGFRCR SERPLBLD CKD-EPI 2021: 92 ML/MIN/1.73M2
FSH SERPL-MCNC: 5.1 IU/L
GLUCOSE SERPL-MCNC: 69 MG/DL
HCT VFR BLD CALC: 41.4 %
HDLC SERPL-MCNC: 68 MG/DL
HGB BLD-MCNC: 13.6 G/DL
HPV HIGH+LOW RISK DNA PNL CVX: NOT DETECTED
LDLC SERPL-MCNC: 167 MG/DL
LH SERPL-ACNC: 3.2 IU/L
MCHC RBC-ENTMCNC: 28.3 PG
MCHC RBC-ENTMCNC: 32.9 G/DL
MCV RBC AUTO: 86.3 FL
N GONORRHOEA RRNA SPEC QL NAA+PROBE: NOT DETECTED
NONHDLC SERPL-MCNC: 193 MG/DL
PLATELET # BLD AUTO: 300 K/UL
POTASSIUM SERPL-SCNC: 4.1 MMOL/L
PROT SERPL-MCNC: 8.2 G/DL
RBC # BLD: 4.8 M/UL
RBC # FLD: 12.8 %
SODIUM SERPL-SCNC: 140 MMOL/L
SOURCE AMPLIFICATION: NORMAL
T PALLIDUM AB SER QL IA: NEGATIVE
TRIGL SERPL-MCNC: 146 MG/DL
TSH SERPL-ACNC: 2.28 UIU/ML
WBC # FLD AUTO: 7.96 K/UL

## 2025-03-21 LAB
BACTERIA UR CULT: ABNORMAL
HBV SURFACE AG SER QL: NONREACTIVE
HCV AB SER QL: NONREACTIVE
HCV S/CO RATIO: 0.19 S/CO
HIV1+2 AB SPEC QL IA.RAPID: NONREACTIVE

## 2025-03-21 RX ORDER — CIPROFLOXACIN HYDROCHLORIDE 500 MG/1
500 TABLET, FILM COATED ORAL
Qty: 6 | Refills: 0 | Status: ACTIVE | COMMUNITY
Start: 2025-03-21 | End: 1900-01-01

## 2025-03-24 LAB — CYTOLOGY CVX/VAG DOC THIN PREP: NORMAL

## 2025-04-18 ENCOUNTER — APPOINTMENT (OUTPATIENT)
Dept: ULTRASOUND IMAGING | Facility: CLINIC | Age: 45
End: 2025-04-18
Payer: COMMERCIAL

## 2025-04-18 ENCOUNTER — APPOINTMENT (OUTPATIENT)
Dept: MAMMOGRAPHY | Facility: CLINIC | Age: 45
End: 2025-04-18
Payer: COMMERCIAL

## 2025-04-18 ENCOUNTER — RESULT REVIEW (OUTPATIENT)
Age: 45
End: 2025-04-18

## 2025-04-18 ENCOUNTER — OUTPATIENT (OUTPATIENT)
Dept: OUTPATIENT SERVICES | Facility: HOSPITAL | Age: 45
LOS: 1 days | End: 2025-04-18
Payer: COMMERCIAL

## 2025-04-18 DIAGNOSIS — Z00.00 ENCOUNTER FOR GENERAL ADULT MEDICAL EXAMINATION WITHOUT ABNORMAL FINDINGS: ICD-10-CM

## 2025-04-18 PROCEDURE — 77063 BREAST TOMOSYNTHESIS BI: CPT | Mod: 26

## 2025-04-18 PROCEDURE — 76641 ULTRASOUND BREAST COMPLETE: CPT | Mod: 26,50

## 2025-04-18 PROCEDURE — 77067 SCR MAMMO BI INCL CAD: CPT | Mod: 26

## 2025-04-18 PROCEDURE — 76830 TRANSVAGINAL US NON-OB: CPT

## 2025-04-18 PROCEDURE — 76830 TRANSVAGINAL US NON-OB: CPT | Mod: 26

## 2025-04-18 PROCEDURE — 76641 ULTRASOUND BREAST COMPLETE: CPT

## 2025-04-18 PROCEDURE — 77063 BREAST TOMOSYNTHESIS BI: CPT

## 2025-04-18 PROCEDURE — 77067 SCR MAMMO BI INCL CAD: CPT
